# Patient Record
Sex: FEMALE | URBAN - METROPOLITAN AREA
[De-identification: names, ages, dates, MRNs, and addresses within clinical notes are randomized per-mention and may not be internally consistent; named-entity substitution may affect disease eponyms.]

---

## 2017-08-14 ENCOUNTER — IMPORTED ENCOUNTER (OUTPATIENT)
Dept: URBAN - METROPOLITAN AREA CLINIC 9 | Facility: CLINIC | Age: 62
End: 2017-08-14

## 2017-08-28 ENCOUNTER — IMPORTED ENCOUNTER (OUTPATIENT)
Dept: URBAN - METROPOLITAN AREA CLINIC 9 | Facility: CLINIC | Age: 62
End: 2017-08-28

## 2017-09-27 PROBLEM — Z96.1: Noted: 2017-09-14

## 2017-09-27 PROBLEM — Z98.890: Noted: 2018-02-06

## 2017-09-27 PROBLEM — Z98.890: Noted: 2020-10-21

## 2017-09-27 PROBLEM — Z96.1: Noted: 2017-09-27

## 2017-09-28 ENCOUNTER — IMPORTED ENCOUNTER (OUTPATIENT)
Dept: URBAN - METROPOLITAN AREA CLINIC 9 | Facility: CLINIC | Age: 62
End: 2017-09-28

## 2017-10-13 ENCOUNTER — IMPORTED ENCOUNTER (OUTPATIENT)
Dept: URBAN - METROPOLITAN AREA CLINIC 9 | Facility: CLINIC | Age: 62
End: 2017-10-13

## 2018-02-06 ENCOUNTER — IMPORTED ENCOUNTER (OUTPATIENT)
Dept: URBAN - METROPOLITAN AREA CLINIC 9 | Facility: CLINIC | Age: 63
End: 2018-02-06

## 2018-02-26 ENCOUNTER — IMPORTED ENCOUNTER (OUTPATIENT)
Dept: URBAN - METROPOLITAN AREA CLINIC 9 | Facility: CLINIC | Age: 63
End: 2018-02-26

## 2018-10-18 ENCOUNTER — IMPORTED ENCOUNTER (OUTPATIENT)
Dept: URBAN - METROPOLITAN AREA CLINIC 9 | Facility: CLINIC | Age: 63
End: 2018-10-18

## 2018-10-18 NOTE — PROCEDURE NOTE: CLINICAL
PROCEDURE NOTE: Punctal Plugs, Silicone #1 OD. Diagnosis: Dysfunctional Tear Syndrome. Anesthesia: Topical. Prior to treatment, the risks/benefits/alternatives were discussed. The patient wished to proceed with procedure. Permanent silicone plugs were inserted. Patient tolerated procedure well. There were no complications. Post procedure instructions given. Size 0.7.. PROCEDURE NOTE: Punctal Plugs, Silicone #1 OS. Diagnosis: Dysfunctional Tear Syndrome. Anesthesia: Topical. Prior to treatment, the risks/benefits/alternatives were discussed. The patient wished to proceed with procedure. Permanent silicone plugs were inserted. Patient tolerated procedure well. There were no complications. Post procedure instructions given. Size 0.7. Grayson Crawford

## 2019-04-24 NOTE — PROCEDURE NOTE: CLINICAL
PROCEDURE NOTE: Punctal Plugs, Silicone #1 OD. Diagnosis: Dysfunctional Tear Syndrome. Anesthesia: Topical. Prior to treatment, the risks/benefits/alternatives were discussed. The patient wished to proceed with procedure. Permanent silicone plugs were inserted. Patient tolerated procedure well. There were no complications. Post procedure instructions given. Size 1.0.. PROCEDURE NOTE: Punctal Plugs, Silicone #1 OS. Diagnosis: Dysfunctional Tear Syndrome. Anesthesia: Topical. Prior to treatment, the risks/benefits/alternatives were discussed. The patient wished to proceed with procedure. Permanent silicone plugs were inserted. Patient tolerated procedure well. There were no complications. Post procedure instructions given. Size 1.0. Bharat Mijares

## 2019-05-21 ENCOUNTER — IMPORTED ENCOUNTER (OUTPATIENT)
Dept: URBAN - METROPOLITAN AREA CLINIC 9 | Facility: CLINIC | Age: 64
End: 2019-05-21

## 2020-10-21 ENCOUNTER — IMPORTED ENCOUNTER (OUTPATIENT)
Dept: URBAN - METROPOLITAN AREA CLINIC 9 | Facility: CLINIC | Age: 65
End: 2020-10-21

## 2020-10-21 PROBLEM — Z98.890: Noted: 2018-02-06

## 2020-10-21 PROBLEM — Z98.890: Noted: 2020-10-21

## 2020-10-21 PROBLEM — Z96.1: Noted: 2017-09-14

## 2020-10-21 PROBLEM — Z96.1: Noted: 2017-09-27

## 2020-10-30 ENCOUNTER — IMPORTED ENCOUNTER (OUTPATIENT)
Dept: URBAN - METROPOLITAN AREA CLINIC 9 | Facility: CLINIC | Age: 65
End: 2020-10-30

## 2021-10-15 ASSESSMENT — KERATOMETRY
OS_K2POWER_DIOPTERS: 47.875
OD_AXISANGLE_DEGREES: 116
OS_AXISANGLE2_DEGREES: 148
OS_AXISANGLE2_DEGREES: 142
OD_AXISANGLE2_DEGREES: 26
OS_AXISANGLE2_DEGREES: 1
OD_AXISANGLE2_DEGREES: 19
OD_K1POWER_DIOPTERS: 46.625
OS_K2POWER_DIOPTERS: 48.5
OD_K1POWER_DIOPTERS: 46.5
OD_K1POWER_DIOPTERS: 47
OD_AXISANGLE2_DEGREES: 2
OS_K1POWER_DIOPTERS: 46.75
OS_K2POWER_DIOPTERS: 48
OS_AXISANGLE_DEGREES: 52
OS_K1POWER_DIOPTERS: 46.75
OS_AXISANGLE_DEGREES: 58
OS_AXISANGLE_DEGREES: 91
OD_K2POWER_DIOPTERS: 47.5
OD_K2POWER_DIOPTERS: 47.625
OD_K2POWER_DIOPTERS: 47.5
OD_AXISANGLE_DEGREES: 92
OS_K1POWER_DIOPTERS: 47
OD_AXISANGLE_DEGREES: 109

## 2021-10-15 ASSESSMENT — VISUAL ACUITY
OS_CC: 20/25 + SN
OD_SC: 20/30 SN
OD_CC: 20/40 SN
OD_CC: 20/30 SN
OS_CC: 20/40 SN
OS_CC: 20/30 SN
OD_CC: 20/25 +2 SN
OD_SC: 20/25 -2 SN
OS_CC: 20/25 SN
OS_CC: 20/40 SN
OS_CC: 20/30 SN
OS_SC: 20/40 SN
OD_CC: 20/40 SN
OD_CC: 20/20 SN
OS_CC: 20/30 SN
OD_CC: 20/20 SN
OD_CC: 20/50 -2 SN
OS_CC: 20/25 +2 SN
OD_CC: 20/25 SN
OD_CC: 20/40 SN
OS_SC: 20/50 SN
OS_CC: 20/40 SN
OS_SC: 20/50 - SN
OS_SC: 20/40 + SN
OD_CC: 20/40 SN
OS_CC: 20/25 SN
OD_SC: 20/40 +2 SN
OD_CC: 20/25 SN
OS_CC: 20/20 - SN

## 2021-10-15 ASSESSMENT — TONOMETRY
OD_IOP_MMHG: 16
OS_IOP_MMHG: 14
OD_IOP_MMHG: 13
OS_IOP_MMHG: 18
OD_IOP_MMHG: 14
OS_IOP_MMHG: 16
OS_IOP_MMHG: 15
OD_IOP_MMHG: 17
OS_IOP_MMHG: 16
OS_IOP_MMHG: 16
OS_IOP_MMHG: 19
OD_IOP_MMHG: 14
OD_IOP_MMHG: 16
OS_IOP_MMHG: 16
OD_IOP_MMHG: 15

## 2022-06-13 ENCOUNTER — ESTABLISHED PATIENT (OUTPATIENT)
Dept: URBAN - NONMETROPOLITAN AREA CLINIC 6 | Facility: CLINIC | Age: 67
End: 2022-06-13

## 2022-06-13 DIAGNOSIS — H26.491: ICD-10-CM

## 2022-06-13 DIAGNOSIS — H04.123: ICD-10-CM

## 2022-06-13 DIAGNOSIS — H43.812: ICD-10-CM

## 2022-06-13 PROCEDURE — 92014 COMPRE OPH EXAM EST PT 1/>: CPT

## 2022-06-13 PROCEDURE — 92015 DETERMINE REFRACTIVE STATE: CPT

## 2022-06-13 ASSESSMENT — KERATOMETRY
OS_AXISANGLE2_DEGREES: 157
OD_K2POWER_DIOPTERS: 47.50
OD_AXISANGLE2_DEGREES: 23
OS_K2POWER_DIOPTERS: 48.00
OS_AXISANGLE_DEGREES: 67
OD_AXISANGLE_DEGREES: 113
OS_K1POWER_DIOPTERS: 46.50
OD_K1POWER_DIOPTERS: 46.75

## 2022-06-13 ASSESSMENT — TONOMETRY
OS_IOP_MMHG: 20
OD_IOP_MMHG: 20

## 2022-06-13 ASSESSMENT — VISUAL ACUITY
OS_CC: 20/25-2
OD_CC: 20/20-1

## 2023-05-09 ENCOUNTER — ESTABLISHED PATIENT (OUTPATIENT)
Dept: URBAN - NONMETROPOLITAN AREA CLINIC 6 | Facility: CLINIC | Age: 68
End: 2023-05-09

## 2023-05-09 DIAGNOSIS — H43.812: ICD-10-CM

## 2023-05-09 DIAGNOSIS — H26.491: ICD-10-CM

## 2023-05-09 DIAGNOSIS — H04.123: ICD-10-CM

## 2023-05-09 PROCEDURE — 92015 DETERMINE REFRACTIVE STATE: CPT

## 2023-05-09 PROCEDURE — 92014 COMPRE OPH EXAM EST PT 1/>: CPT

## 2023-05-09 ASSESSMENT — TONOMETRY
OD_IOP_MMHG: 15
OS_IOP_MMHG: 18

## 2023-05-09 ASSESSMENT — KERATOMETRY
OS_K1POWER_DIOPTERS: 47.25
OD_K1POWER_DIOPTERS: 46.75
OD_K2POWER_DIOPTERS: 47.50
OS_AXISANGLE_DEGREES: 67
OS_AXISANGLE_DEGREES: 58
OS_AXISANGLE2_DEGREES: 157
OD_AXISANGLE2_DEGREES: 23
OD_AXISANGLE2_DEGREES: 24
OD_K1POWER_DIOPTERS: 47.00
OS_K1POWER_DIOPTERS: 46.50
OS_K2POWER_DIOPTERS: 48.50
OS_AXISANGLE2_DEGREES: 148
OD_K2POWER_DIOPTERS: 48.00
OD_AXISANGLE_DEGREES: 114
OS_K2POWER_DIOPTERS: 48.00
OD_AXISANGLE_DEGREES: 113

## 2023-05-09 ASSESSMENT — VISUAL ACUITY
OD_GLARE: 20/30
OS_CC: 20/25
OS_GLARE: 20/30
OD_SC: 20/40-2
OD_CC: 20/25
OS_SC: 20/40

## 2024-06-10 ENCOUNTER — ESTABLISHED PATIENT (OUTPATIENT)
Dept: URBAN - NONMETROPOLITAN AREA CLINIC 6 | Facility: CLINIC | Age: 69
End: 2024-06-10

## 2024-06-10 DIAGNOSIS — H43.812: ICD-10-CM

## 2024-06-10 DIAGNOSIS — H26.491: ICD-10-CM

## 2024-06-10 DIAGNOSIS — H04.123: ICD-10-CM

## 2024-06-10 PROCEDURE — 92015 DETERMINE REFRACTIVE STATE: CPT

## 2024-06-10 PROCEDURE — 92014 COMPRE OPH EXAM EST PT 1/>: CPT

## 2024-06-10 ASSESSMENT — KERATOMETRY
OS_K2POWER_DIOPTERS: 48.25
OD_K1POWER_DIOPTERS: 47.25
OD_K2POWER_DIOPTERS: 48.00
OS_K1POWER_DIOPTERS: 47.25
OD_AXISANGLE_DEGREES: 106
OS_AXISANGLE2_DEGREES: 152
OD_AXISANGLE2_DEGREES: 16
OS_AXISANGLE_DEGREES: 62

## 2024-06-10 ASSESSMENT — TONOMETRY
OD_IOP_MMHG: 15
OS_IOP_MMHG: 13

## 2024-06-10 ASSESSMENT — VISUAL ACUITY
OS_SC: 20/50-2
OD_SC: 20/50
OS_CC: 20/30
OU_SC: 20/50
OD_CC: 20/25
OS_GLARE: 20/30
OU_CC: 20/25
OD_GLARE: 20/40